# Patient Record
Sex: MALE | Race: WHITE | Employment: PART TIME | URBAN - METROPOLITAN AREA
[De-identification: names, ages, dates, MRNs, and addresses within clinical notes are randomized per-mention and may not be internally consistent; named-entity substitution may affect disease eponyms.]

---

## 2024-02-02 ENCOUNTER — HOSPITAL ENCOUNTER (OUTPATIENT)
Age: 24
Discharge: HOME | End: 2024-02-02
Payer: COMMERCIAL

## 2024-02-02 VITALS
SYSTOLIC BLOOD PRESSURE: 131 MMHG | HEIGHT: 72 IN | HEART RATE: 60 BPM | BODY MASS INDEX: 21.67 KG/M2 | TEMPERATURE: 98 F | RESPIRATION RATE: 16 BRPM | WEIGHT: 160 LBS | OXYGEN SATURATION: 99 % | DIASTOLIC BLOOD PRESSURE: 74 MMHG

## 2024-02-02 DIAGNOSIS — L03.012 PARONYCHIA OF FINGER, LEFT: Primary | ICD-10-CM

## 2024-02-02 PROCEDURE — 10060 I&D ABSCESS SIMPLE/SINGLE: CPT

## 2024-02-02 PROCEDURE — 99204 OFFICE O/P NEW MOD 45 MIN: CPT

## 2024-02-02 PROCEDURE — 99203 OFFICE O/P NEW LOW 30 MIN: CPT

## 2024-02-02 RX ORDER — CEPHALEXIN 500 MG/1
500 CAPSULE ORAL 4 TIMES DAILY
Qty: 28 CAPSULE | Refills: 0 | Status: SHIPPED | OUTPATIENT
Start: 2024-02-02 | End: 2024-02-09

## 2024-02-03 NOTE — ED PROVIDER NOTES
Patient Seen in: [facility]      History     Chief Complaint   Patient presents with    Finger Pain     Stated Complaint: left middle finger blister    Subjective:   HPI    23-year-old well-appearing male presents with paronychia of left middle finger.  Atraumatic in nature.  Pain 4/10.    Objective:   History reviewed. No pertinent past medical history.           History reviewed. No pertinent surgical history.             Social History     Socioeconomic History    Marital status: Single   Tobacco Use    Smoking status: Never    Smokeless tobacco: Never              Review of Systems   All other systems reviewed and are negative.      Positive for stated complaint: left middle finger blister  Other systems are as noted in HPI.  Constitutional and vital signs reviewed.      All other systems reviewed and negative except as noted above.    Physical Exam     ED Triage Vitals [02/02/24 1642]   /61   Pulse 64   Resp 20   Temp 97.8 °F (36.6 °C)   Temp src Temporal   SpO2 99 %   O2 Device None (Room air)       Current:/61   Pulse 64   Temp 97.8 °F (36.6 °C) (Temporal)   Resp 20   Ht 182.9 cm (6')   Wt 72.6 kg   SpO2 99%   BMI 21.70 kg/m²         Physical Exam  Vitals and nursing note reviewed.   Constitutional:       General: He is not in acute distress.     Appearance: Normal appearance. He is normal weight. He is not ill-appearing, toxic-appearing or diaphoretic.   HENT:      Head: Normocephalic and atraumatic.      Nose: Nose normal.   Eyes:      Extraocular Movements: Extraocular movements intact.      Pupils: Pupils are equal, round, and reactive to light.   Cardiovascular:      Rate and Rhythm: Normal rate.      Pulses: Normal pulses.   Pulmonary:      Effort: Pulmonary effort is normal.      Breath sounds: Normal breath sounds.   Musculoskeletal:         General: Swelling and tenderness present. No deformity or signs of injury. Normal range of motion.      Cervical back: Normal  range of motion and neck supple.      Comments: Redness/swelling/tenderness palpation over lateral aspect of left middle finger nailbed   Skin:     General: Skin is warm and dry.      Capillary Refill: Capillary refill takes less than 2 seconds.      Coloration: Skin is not jaundiced or pale.      Findings: Erythema present. No bruising, lesion or rash.   Neurological:      General: No focal deficit present.      Mental Status: He is alert and oriented to person, place, and time. Mental status is at baseline.   Psychiatric:         Mood and Affect: Mood normal.         Behavior: Behavior normal.         Thought Content: Thought content normal.         Judgment: Judgment normal.               ED Course   Labs Reviewed - No data to display  Abscess Procedure   Location: Left middle finger  Size: 0.5 cm  Anesthetized with let applied topically and lidocaine with without epinephrine  Area prepped with Betadine  Incision: single straight extending to dermis with 11 blade scalpel  Discharge: Scant amount of purulent discharge   Packing wound left open  Dressing applied with non adherent and Kerlix  Patient  tolerated procedure well                   MDM                                        Medical Decision Making    Geovanni Hogan is a 23 year old male presents with paronychia of left middle finger  Plan  - I and D  - dressing applied to incision site   - return to ED for wound check in 48 hours, if needed   - Rx: Keflex 500 mg p.o. 4 times daily x 7 days.     - OTC ibuprofen 600mg po q8 hours/ prn. Tylenol 1g po q 6 hours/ prn.    - discharge to home   - return to ED sooner if worsening symptoms should occur including but not limited to fevers, chills, worsening pain, increased discharge.            Disposition and Plan     Clinical Impression:  1. Paronychia of finger, left         Disposition:  There is no disposition on file for this visit.  There is no disposition time on file for this  visit.    Follow-up:  [facility], [address], [place]  [address]  [facility] [address] [phone]        [facility]  [address]  [facility] [address]  [phone]        Juan R Cox MD  [address]  [address]  [facility] [id number] [phone]                Medications Prescribed:  Current Discharge Medication List        START taking these medications    Details   cephalexin 500 MG Oral Cap Take 1 capsule (500 mg total) by mouth 4 (four) times daily for 7 days.  Qty: 28 capsule, Refills: 0

## 2025-03-11 NOTE — ED INITIAL ASSESSMENT (HPI)
Pt to the ed for complaints of pain to left ribs following being hit with a pitch while playing baseball yesterday. Reports painful inspiration

## 2025-03-11 NOTE — ED PROVIDER NOTES
Patient Seen in: [facility] Emergency Department    History   No chief complaint on file.    Stated Complaint: pain to left ribs from injury yesterday     HPI    24-year-old male without past medical history presented for evaluation of ongoing left lateral chest wall pain and bruising status post blunt injury sustained while playing baseball, at bat and being struck by wild pitch to thorax at 9 PM last evening.  No anticoagulant or antiplatelet use.  No hemoptysis.  No other traumatic complaints.    History reviewed. No pertinent past medical history.    History reviewed. No pertinent surgical history.         History reviewed. No pertinent family history.    Social History     Socioeconomic History    Marital status: Single   Tobacco Use    Smoking status: Never    Smokeless tobacco: Never       Review of Systems :  Constitutional: As per HP  Respiratory: Negative for cough.  Cardiovascular: (+) chest pain.    Positive for stated complaint: pain to left ribs from injury yesterday  Other systems are as noted in HPI.  Constitutional and vital signs reviewed.      All other systems reviewed and negative except as noted above.    PSFH elements reviewed from today and agreed except as otherwise stated in HPI.    Physical Exam     ED Triage Vitals [03/11/25 1102]   /64   Pulse 77   Resp 20   Temp 98 °F (36.7 °C)   Temp src    SpO2 96 %   O2 Device None (Room air)       Current:/64   Pulse 77   Temp 98 °F (36.7 °C)   Resp 20   Ht 182.9 cm (6')   Wt 72.6 kg   SpO2 96%   BMI 21.70 kg/m²         Physical Exam   Constitutional: No distress.   HEENT: MMM.  Head: Normocephalic.   Eyes: No injection.  Cardiovascular: RRR.   Pulmonary/Chest: Effort normal. CTAB.  Left lateral chest wall pain/bruising without crepitance.  Abdominal: Soft.  Nontender.  Musculoskeletal: No gross deformity.  Neurological: Alert.   Skin: Skin is warm.   Psychiatric: Cooperative.  Nursing note and vitals reviewed.        ED  Course   Labs Reviewed - No data to display  XR RIBS WITH CHEST (3 VIEWS), LEFT  (CPT=71101)    Result Date: 3/11/2025  PROCEDURE: XR RIBS WITH CHEST (3 VIEWS), LEFT (CPT=71101)  COMPARISON: None.  INDICATIONS: Mid to lower axillary rib pain post injury x1 day.  TECHNIQUE:   Four views.   FINDINGS:   BONES: Acute nondisplaced left anterior-lateral 9th rib fracture. SOFT TISSUES: Negative.  No visible soft tissue swelling.  LUNGS: Normal  PLEURA: Normal.  No effusion or pneumothorax. OTHER: Negative.         CONCLUSION: Acute left 9th rib fracture.  No acute cardiopulmonary abnormality.    Dictated by (CST): Cedric Polk MD on 3/11/2025 at 11:59 AM     Finalized by (CST): Cedric Polk MD on 3/11/2025 at 12:01 PM                MDM   DIFFERENTIAL DIAGNOSIS: After history and physical exam differential diagnosis includes but is not limited to rib fracture versus contusion, pulmonary contusion, hemothorax, pneumothorax.    Pulse ox: 96%:Normal on RA, as independently interpreted by myself    Medical Decision Making    Evaluation for blunt injury from baseball to left thorax without hemoptysis or other traumatic complaints and patient without anticoagulant or antiplatelet use.  Patient without hypoxia and chest x-ray without pulmonary complications -stable for discharge with symptomatic care including analgesia/incentive spirometry with ongoing outpatient follow-up.      Problems Addressed:  Closed fracture of one rib of left side, initial encounter: acute illness or injury    Amount and/or Complexity of Data Reviewed  Independent Historian: parent     Details: Mother at bedside for collateral history  Radiology: ordered and independent interpretation performed. Decision-making details documented in ED Course.     Details: CXR without obvious pneumothorax as independently interpreted by myself      Risk  Prescription drug management.      I was wearing at minimum a facemask and eye protection throughout this encounter  with handwashing performed prior and after patient evaluation without personal hand/facial/oropharyngeal contact and gloves worn throughout encounter. See note and/or contact this provider for further PPE details.    Disposition and Plan     Clinical Impression:  1. Closed fracture of one rib of left side, initial encounter        Disposition:  Discharge    Follow-up:  Will Shelton MD  [address] [place] [address]  [address]  [place] [id number]  [phone]    Call  For followup and re-evaluation      Medications Prescribed:  Discharge Medication List as of 3/11/2025 12:53 PM        START taking these medications    Details   Ketorolac Tromethamine 10 MG Oral Tab Take 1 tablet (10 mg total) by mouth every 6 (six) hours as needed for Pain. Avoid taking other NSAIDs while on this medication including naproxen/aleve, ibuprofen/motrin, meloxicam/mobic. Patient received dose of parenteral ketorolac in the Emergency De partment., Normal, Disp-20 tablet, R-0      HYDROcodone-acetaminophen 5-325 MG Oral Tab Take 1 tablet by mouth every 8 (eight) hours as needed for Pain., Normal, Disp-9 tablet, R-0      lidocaine 5 % External Patch Place 1 patch onto the skin daily for 7 days. Apply to skin for 12 hours at a time, then remove for next 12 hours. Do not apply over broken/irritated skin., Normal, Disp-7 patch, R-0      diclofenac 1 % External Gel Apply 2 g topically 4 (four) times daily as needed., Normal, Disp-100 g, R-0